# Patient Record
Sex: MALE | Race: WHITE | NOT HISPANIC OR LATINO
[De-identification: names, ages, dates, MRNs, and addresses within clinical notes are randomized per-mention and may not be internally consistent; named-entity substitution may affect disease eponyms.]

---

## 2019-07-01 ENCOUNTER — RECORDS - HEALTHEAST (OUTPATIENT)
Dept: ADMINISTRATIVE | Facility: OTHER | Age: 69
End: 2019-07-01

## 2019-07-05 ENCOUNTER — HOSPITAL ENCOUNTER (OUTPATIENT)
Dept: INTERVENTIONAL RADIOLOGY/VASCULAR | Facility: HOSPITAL | Age: 69
Discharge: HOME | End: 2019-07-05
Attending: NURSE PRACTITIONER | Admitting: RADIOLOGY

## 2019-07-05 DIAGNOSIS — C34.90 NON-SMALL CELL LUNG CANCER (H): ICD-10-CM

## 2019-07-05 LAB
HGB BLD-MCNC: 11.6 G/DL (ref 14–18)
INR PPP: 1.05 (ref 0.9–1.1)
PLATELET # BLD AUTO: 223 THOU/UL (ref 140–440)

## 2019-07-05 RX ORDER — HYDROCHLOROTHIAZIDE 12.5 MG/1
12.5 CAPSULE ORAL DAILY
Status: SHIPPED | COMMUNITY
Start: 2019-07-05

## 2019-07-05 RX ORDER — ESCITALOPRAM OXALATE 10 MG/1
10 TABLET ORAL DAILY
Status: SHIPPED | COMMUNITY
Start: 2019-07-05

## 2019-07-05 RX ORDER — ASPIRIN 81 MG/1
81 TABLET, CHEWABLE ORAL DAILY
Status: SHIPPED | COMMUNITY
Start: 2019-07-05

## 2019-07-05 ASSESSMENT — MIFFLIN-ST. JEOR: SCORE: 1545.04

## 2021-05-30 NOTE — H&P
[facility] Radiology History and Physical Note    Procedure Requested: Port placement   Requesting Provider: Mallory Hart CNP    HPI: Jan Soto is a 69 y.o. old male with a history of HTN, hyperlipidemia, CAD, COPD and a recently diagnosed right upper lobe non small cell lung cancer s/p recent wedge resection at [facility] that presents for port placement for chemotherapy.      NPO Status: Midnight   Anticoagulation/Antiplatelets/Bleeding tendencies: Aspirin  Antibiotics: Clindamycin dose ordered pre procedure       PAST MEDICAL HISTORY:   Past Medical History:   Diagnosis Date     COPD (chronic obstructive pulmonary disease) (H)      Coronary artery disease      Diabetes mellitus (H)      Hyperlipidemia      Hypertension        PAST SURGICAL HISTORY:  Past Surgical History:   Procedure Laterality Date     AK REPAIR OF NASAL SEPTUM      Description: Septoplasty;  Recorded: 05/21/2014;       ALLERGIES:  Penicillins    MEDICATIONS:  Current Outpatient Medications   Medication Sig Dispense Refill     aspirin 81 mg chewable tablet Chew 81 mg daily.       escitalopram oxalate (LEXAPRO) 10 MG tablet Take 10 mg by mouth daily.       hydroCHLOROthiazide (MICROZIDE) 12.5 mg capsule Take 12.5 mg by mouth daily.       simvastatin (ZOCOR) 20 MG tablet Take 20 mg by mouth bedtime.       metFORMIN (GLUCOPHAGE) 850 MG tablet Take 850 mg by mouth 2 (two) times a day with meals.       Current Facility-Administered Medications   Medication Dose Route Frequency Provider Last Rate Last Dose     clindamycin in  mg/50 mL IVPB 900 mg (CLEOCIN)  900 mg Intravenous 30 Min Pre-Op Ceci Rodney  mL/hr at 07/05/19 1234 900 mg at 07/05/19 1234     sodium chloride bacteriostatic 0.9 % injection 0.1-0.3 mL  0.1-0.3 mL Subcutaneous PRN Ceci Rodney NP         sodium chloride flush 3 mL (NS)  3 mL Intravenous Line Care Ceci Rodney NP           LABS:  INR (no units)   Date Value   07/05/2019 1.05     Hemoglobin  (g/dL)   Date Value   07/05/2019 11.6 (L)     Platelets (thou/uL)   Date Value   07/05/2019 223       EXAM:  /64   Pulse 66   Temp 98.2  F (36.8  C) (Oral)   Resp 16   Ht 6' (1.829 m)   Wt 168 lb (76.2 kg)   SpO2 99%   BMI 22.78 kg/m    General: Stable. In no acute distress.  Neuro: A&O x 3. Moves all extremities equally.  Resp: Rate regular, breathing non-labored, right anterior lung sounds diminished, left anterior lung clear to auscultation bilaterally.  Cardio: S1S2 and reg, without murmur, clicks or rubs  Skin: Upper chest hairy otherwise clean and clear bilaterally.    MSK: No gross motor weakness. Sensation intact.     Pre-Sedation Assessment:  Mallampati Airway Classification: Class 2: upper half of tonsil fossa visible  Previous reaction to anesthesia/sedation: no  Sedation plan based on assessment: Moderate  Sleep Apnea: no  Dentures: no  COPD: yes  ASA Classification: ASA 3 - Patient with moderate systemic disease with functional limitations  Comments: no hx of asthma       ASSESSMENT: 69 y.o. old male with right sided lung cancer s/p recent wedge resection at [facility] that presents for port placement for chemotherapy.      PLAN: Left chest port placement with sedation.      Case discussed and imaging reviewed with Dr. Cabral.      The procedure, risks and moderate sedation were discussed with the patient, all questions answered and patient agrees to proceed with the procedure. Written consent obtained.    Ceci ANNA, CNP  [facility]: Interventional Radiology   [phone]

## 2021-05-30 NOTE — INTERVAL H&P NOTE
I have performed an assessment and examined the patient, as necessary, to update the patient's current status that may have changed since the prior History and Physical. No change.

## 2021-06-03 VITALS — HEIGHT: 72 IN | BODY MASS INDEX: 22.75 KG/M2 | WEIGHT: 168 LBS
